# Patient Record
Sex: FEMALE | Race: WHITE | HISPANIC OR LATINO | URBAN - METROPOLITAN AREA
[De-identification: names, ages, dates, MRNs, and addresses within clinical notes are randomized per-mention and may not be internally consistent; named-entity substitution may affect disease eponyms.]

---

## 2022-04-06 ENCOUNTER — APPOINTMENT (RX ONLY)
Dept: URBAN - METROPOLITAN AREA CLINIC 328 | Facility: CLINIC | Age: 32
Setting detail: DERMATOLOGY
End: 2022-04-06

## 2022-04-06 DIAGNOSIS — L70.0 ACNE VULGARIS: ICD-10-CM | Status: INADEQUATELY CONTROLLED

## 2022-04-06 PROCEDURE — ? ADDITIONAL NOTES

## 2022-04-06 PROCEDURE — 99203 OFFICE O/P NEW LOW 30 MIN: CPT

## 2022-04-06 PROCEDURE — ? IN-HOUSE DISPENSING PHARMACY

## 2022-04-06 PROCEDURE — ? COUNSELING

## 2022-04-06 ASSESSMENT — LOCATION SIMPLE DESCRIPTION DERM: LOCATION SIMPLE: LEFT CHEEK

## 2022-04-06 ASSESSMENT — LOCATION DETAILED DESCRIPTION DERM
LOCATION DETAILED: LEFT INFERIOR CENTRAL MALAR CHEEK
LOCATION DETAILED: LEFT CENTRAL MALAR CHEEK

## 2022-04-06 ASSESSMENT — LOCATION ZONE DERM: LOCATION ZONE: FACE

## 2022-04-06 NOTE — PROCEDURE: COUNSELING
Tazorac Pregnancy And Lactation Text: This medication is not safe during pregnancy. It is unknown if this medication is excreted in breast milk.
Tetracycline Counseling: Patient counseled regarding possible photosensitivity and increased risk for sunburn.  Patient instructed to avoid sunlight, if possible.  When exposed to sunlight, patients should wear protective clothing, sunglasses, and sunscreen.  The patient was instructed to call the office immediately if the following severe adverse effects occur:  hearing changes, easy bruising/bleeding, severe headache, or vision changes.  The patient verbalized understanding of the proper use and possible adverse effects of tetracycline.  All of the patient's questions and concerns were addressed. Patient understands to avoid pregnancy while on therapy due to potential birth defects.
Topical Clindamycin Pregnancy And Lactation Text: This medication is Pregnancy Category B and is considered safe during pregnancy. It is unknown if it is excreted in breast milk.
Minocycline Pregnancy And Lactation Text: This medication is Pregnancy Category D and not consider safe during pregnancy. It is also excreted in breast milk.
Erythromycin Counseling:  I discussed with the patient the risks of erythromycin including but not limited to GI upset, allergic reaction, drug rash, diarrhea, increase in liver enzymes, and yeast infections.
Bactrim Pregnancy And Lactation Text: This medication is Pregnancy Category D and is known to cause fetal risk.  It is also excreted in breast milk.
Sarecycline Counseling: Patient advised regarding possible photosensitivity and discoloration of the teeth, skin, lips, tongue and gums.  Patient instructed to avoid sunlight, if possible.  When exposed to sunlight, patients should wear protective clothing, sunglasses, and sunscreen.  The patient was instructed to call the office immediately if the following severe adverse effects occur:  hearing changes, easy bruising/bleeding, severe headache, or vision changes.  The patient verbalized understanding of the proper use and possible adverse effects of sarecycline.  All of the patient's questions and concerns were addressed.
Birth Control Pills Counseling: Birth Control Pill Counseling: I discussed with the patient the potential side effects of OCPs including but not limited to increased risk of stroke, heart attack, thrombophlebitis, deep venous thrombosis, hepatic adenomas, breast changes, GI upset, headaches, and depression.  The patient verbalized understanding of the proper use and possible adverse effects of OCPs. All of the patient's questions and concerns were addressed.
Benzoyl Peroxide Pregnancy And Lactation Text: This medication is Pregnancy Category C. It is unknown if benzoyl peroxide is excreted in breast milk.
Topical Sulfur Applications Pregnancy And Lactation Text: This medication is Pregnancy Category C and has an unknown safety profile during pregnancy. It is unknown if this topical medication is excreted in breast milk.
Isotretinoin Pregnancy And Lactation Text: This medication is Pregnancy Category X and is considered extremely dangerous during pregnancy. It is unknown if it is excreted in breast milk.
Winlevi Counseling:  I discussed with the patient the risks of topical clascoterone including but not limited to erythema, scaling, itching, and stinging. Patient voiced their understanding.
Dapsone Counseling: I discussed with the patient the risks of dapsone including but not limited to hemolytic anemia, agranulocytosis, rashes, methemoglobinemia, kidney failure, peripheral neuropathy, headaches, GI upset, and liver toxicity.  Patients who start dapsone require monitoring including baseline LFTs and weekly CBCs for the first month, then every month thereafter.  The patient verbalized understanding of the proper use and possible adverse effects of dapsone.  All of the patient's questions and concerns were addressed.
Topical Retinoid Pregnancy And Lactation Text: This medication is Pregnancy Category C. It is unknown if this medication is excreted in breast milk.
Spironolactone Counseling: Patient advised regarding risks of diarrhea, abdominal pain, hyperkalemia, birth defects (for female patients), liver toxicity and renal toxicity. The patient may need blood work to monitor liver and kidney function and potassium levels while on therapy. The patient verbalized understanding of the proper use and possible adverse effects of spironolactone.  All of the patient's questions and concerns were addressed.
Doxycycline Counseling:  Patient counseled regarding possible photosensitivity and increased risk for sunburn.  Patient instructed to avoid sunlight, if possible.  When exposed to sunlight, patients should wear protective clothing, sunglasses, and sunscreen.  The patient was instructed to call the office immediately if the following severe adverse effects occur:  hearing changes, easy bruising/bleeding, severe headache, or vision changes.  The patient verbalized understanding of the proper use and possible adverse effects of doxycycline.  All of the patient's questions and concerns were addressed.
High Dose Vitamin A Pregnancy And Lactation Text: High dose vitamin A therapy is contraindicated during pregnancy and breast feeding.
Azithromycin Pregnancy And Lactation Text: This medication is considered safe during pregnancy and is also secreted in breast milk.
Use Enhanced Medication Counseling?: No
Spironolactone Pregnancy And Lactation Text: This medication can cause feminization of the male fetus and should be avoided during pregnancy. The active metabolite is also found in breast milk.
Minocycline Counseling: Patient advised regarding possible photosensitivity and discoloration of the teeth, skin, lips, tongue and gums.  Patient instructed to avoid sunlight, if possible.  When exposed to sunlight, patients should wear protective clothing, sunglasses, and sunscreen.  The patient was instructed to call the office immediately if the following severe adverse effects occur:  hearing changes, easy bruising/bleeding, severe headache, or vision changes.  The patient verbalized understanding of the proper use and possible adverse effects of minocycline.  All of the patient's questions and concerns were addressed.
Bactrim Counseling:  I discussed with the patient the risks of sulfa antibiotics including but not limited to GI upset, allergic reaction, drug rash, diarrhea, dizziness, photosensitivity, and yeast infections.  Rarely, more serious reactions can occur including but not limited to aplastic anemia, agranulocytosis, methemoglobinemia, blood dyscrasias, liver or kidney failure, lung infiltrates or desquamative/blistering drug rashes.
Doxycycline Pregnancy And Lactation Text: This medication is Pregnancy Category D and not consider safe during pregnancy. It is also excreted in breast milk but is considered safe for shorter treatment courses.
Topical Clindamycin Counseling: Patient counseled that this medication may cause skin irritation or allergic reactions.  In the event of skin irritation, the patient was advised to reduce the amount of the drug applied or use it less frequently.   The patient verbalized understanding of the proper use and possible adverse effects of clindamycin.  All of the patient's questions and concerns were addressed.
Erythromycin Pregnancy And Lactation Text: This medication is Pregnancy Category B and is considered safe during pregnancy. It is also excreted in breast milk.
Benzoyl Peroxide Counseling: Patient counseled that medicine may cause skin irritation and bleach clothing.  In the event of skin irritation, the patient was advised to reduce the amount of the drug applied or use it less frequently.   The patient verbalized understanding of the proper use and possible adverse effects of benzoyl peroxide.  All of the patient's questions and concerns were addressed.
Topical Sulfur Applications Counseling: Topical Sulfur Counseling: Patient counseled that this medication may cause skin irritation or allergic reactions.  In the event of skin irritation, the patient was advised to reduce the amount of the drug applied or use it less frequently.   The patient verbalized understanding of the proper use and possible adverse effects of topical sulfur application.  All of the patient's questions and concerns were addressed.
Isotretinoin Counseling: Patient should get monthly blood tests, not donate blood, not drive at night if vision affected, not share medication, and not undergo elective surgery for 6 months after tx completed. Side effects reviewed, pt to contact office should one occur.
Birth Control Pills Pregnancy And Lactation Text: This medication should be avoided if pregnant and for the first 30 days post-partum.
Detail Level: Zone
Topical Retinoid counseling:  Patient advised to apply a pea-sized amount only at bedtime and wait 30 minutes after washing their face before applying.  If too drying, patient may add a non-comedogenic moisturizer. The patient verbalized understanding of the proper use and possible adverse effects of retinoids.  All of the patient's questions and concerns were addressed.
Tazorac Counseling:  Patient advised that medication is irritating and drying.  Patient may need to apply sparingly and wash off after an hour before eventually leaving it on overnight.  The patient verbalized understanding of the proper use and possible adverse effects of tazorac.  All of the patient's questions and concerns were addressed.
Winlevi Pregnancy And Lactation Text: This medication is considered safe during pregnancy and breastfeeding.
Dapsone Pregnancy And Lactation Text: This medication is Pregnancy Category C and is not considered safe during pregnancy or breast feeding.
High Dose Vitamin A Counseling: Side effects reviewed, pt to contact office should one occur.
Azithromycin Counseling:  I discussed with the patient the risks of azithromycin including but not limited to GI upset, allergic reaction, drug rash, diarrhea, and yeast infections.

## 2022-04-06 NOTE — PROCEDURE: ADDITIONAL NOTES
Render Risk Assessment In Note?: no
Detail Level: Simple
Additional Notes: Patient consent was obtained to proceed with the visit and recommended plan of care after discussion of all risks and benefits, including the risks of COVID-19 exposure
Additional Notes: DS recommended to follow up with aestheticians

## 2022-04-06 NOTE — PROCEDURE: IN-HOUSE DISPENSING PHARMACY
Product 49 Price/Unit (In Dollars): 0
Product 67 Unit Type: mg
Product 13 Price/Unit (In Dollars): 45
Product 17 Amount/Unit (Numbers Only): 1
Detail Level: Zone
Product 24 Application Directions: Take one tablet twice daily or as directed by provider.
Product 4 Unit Type: bottle(s)
Name Of Product 11: #11 Antibacterial Ointment
Product 15 Application Directions: Apply to the scalp 2-3 times weekly. Can be applied every other shampoo as well.
Name Of Product 20: #20 Loratadine
Product 22 Amount/Unit (Numbers Only): 18
Product 7 Price/Unit (In Dollars): 55
Send Charges To Patient Encounter: Yes
Product 18 Price/Unit (In Dollars): 10
Render Product Pricing In Note: No
Name Of Product 5: #5 Actinic Keratosis (Actinic Gel)
Product 24 Unit Type: capsules
Product 9 Application Directions: Apply to the affected area three times daily.
Name Of Product 16: #16 Dermatitis Topical Solution
Product 20 Application Directions: Take one tablet daily.
Product 20 Unit Type: tablets
Product 11 Application Directions: Apply twice daily to wound/affected areas.
Product 5 Application Directions: For Warts/MC- apply to the area 2-3 times weekly.\\nFor (Pre)Skin Cancer- apply nightly for 2-6 weeks or as directed by physician.
Product 3 Price/Unit (In Dollars): 40
Name Of Product 1: #1 Acne Gel
Product 18 Amount/Unit (Numbers Only): 20
Product 3 Application Directions: Apply to the face once daily AM or PM depending if Retinol or Adapalene given for bedtime.
Product 23 Application Directions: Use as directed by provider.
Name Of Product 8: #8 Anti Fungal Cream
Name Of Product 19: #19 Doxycycline
Product 12 Price/Unit (In Dollars): 60
Name Of Product 10: #10 Fungal Dermatitis Cream
Product 14 Application Directions: Apply to the face once daily (AM or PM).
Product 12 Refills: 3
Name Of Product 24: Spironolactone 25mg
Name Of Product 4: #4 Acne Gel
Product 8 Application Directions: Apply to the affected area twice daily.
Name Of Product 15: #15 Antifungal Shampoo
Product 19 Application Directions: Take one tablet twice daily with food or as directed by provider.
Product 10 Application Directions: Apply to the affected area twice daily for 10 days.
Product 4 Application Directions: Apply to face at bedtime.
Name Of Product 9: #9 Dermatitis Cream
Product 2 Price/Unit (In Dollars): 50
Product 2 Application Directions: Apply to the face at bedtime. Start every other night hen increase usage depending on tolerance.
Product 20 Price/Unit (In Dollars): 15
Name Of Product 7: #7 Anti Fungal Nail Solution
Name Of Product 18: #18 Bactrim
Product 13 Application Directions: Apply to the face thin layer daily.
Product 7 Application Directions: Apply to the nails daily. (Please do not apply socks for about 5 hours, best if applied at night) Remove once weekly then start again.
Product 16 Price/Unit (In Dollars): 35
Product 20 Amount/Unit (Numbers Only): 30
Name Of Product 3: #3 Acne Gel Combo
Name Of Product 14: #14 Rosacea Silicone Gel
Name Of Product 23: #23 Prednisone 50mg
Name Of Product 12: #12 Melasma Emulsion
Product 16 Application Directions: Apply to the affected area 1-2 times daily depending on diagnosis.
Name Of Product 21: #21 Minocycline
Product 1 Application Directions: Apply to the face twice daily (if another rx is being prescribed it will only be applied in the AM)
Product 21 Application Directions: Take one tablet twice daily with food.
Name Of Product 6: #6 Alopecia Topical Solution/Gel
Product 23 Amount/Unit (Numbers Only): 5
Name Of Product 17: #17 Xerosis Gel
Product 12 Application Directions: Apply to the face three times weekly at night for two months only. Take a two month break and then continue for two months again. Can be applied with Lytera daily.
Product 6 Application Directions: Apply to the scalp three times weekly. (Suggested Monday, Wednesday, Friday)
Name Of Product 22: #22 Prednisone 20mg
Name Of Product 2: #2 Acne Moisturizing Cream
Name Of Product 13: #13 Rosacea Cream
Product 17 Application Directions: Apply daily to affected areas.

## 2022-06-08 ENCOUNTER — APPOINTMENT (RX ONLY)
Dept: URBAN - METROPOLITAN AREA CLINIC 328 | Facility: CLINIC | Age: 32
Setting detail: DERMATOLOGY
End: 2022-06-08

## 2022-06-08 DIAGNOSIS — L70.0 ACNE VULGARIS: ICD-10-CM

## 2022-06-08 PROCEDURE — 99213 OFFICE O/P EST LOW 20 MIN: CPT

## 2022-06-08 PROCEDURE — ? COUNSELING

## 2022-06-08 PROCEDURE — ? IN-HOUSE DISPENSING PHARMACY

## 2022-06-08 ASSESSMENT — LOCATION SIMPLE DESCRIPTION DERM: LOCATION SIMPLE: LEFT CHEEK

## 2022-06-08 ASSESSMENT — LOCATION DETAILED DESCRIPTION DERM
LOCATION DETAILED: LEFT INFERIOR CENTRAL MALAR CHEEK
LOCATION DETAILED: LEFT CENTRAL MALAR CHEEK

## 2022-06-08 ASSESSMENT — LOCATION ZONE DERM: LOCATION ZONE: FACE

## 2022-06-08 NOTE — PROCEDURE: IN-HOUSE DISPENSING PHARMACY
Product 49 Price/Unit (In Dollars): 0
Product 67 Unit Type: mg
Product 13 Price/Unit (In Dollars): 45
Product 17 Amount/Unit (Numbers Only): 1
Detail Level: Zone
Product 24 Application Directions: Take one tablet twice daily or as directed by provider.
Product 4 Unit Type: bottle(s)
Name Of Product 11: #11 Antibacterial Ointment
Product 15 Application Directions: Apply to the scalp 2-3 times weekly. Can be applied every other shampoo as well.
Name Of Product 20: #20 Loratadine
Product 22 Amount/Unit (Numbers Only): 18
Product 7 Price/Unit (In Dollars): 55
Send Charges To Patient Encounter: Yes
Product 18 Price/Unit (In Dollars): 10
Render Product Pricing In Note: No
Name Of Product 5: #5 Actinic Keratosis (Actinic Gel)
Product 24 Unit Type: capsules
Product 9 Application Directions: Apply to the affected area three times daily.
Name Of Product 16: #16 Dermatitis Topical Solution
Product 20 Application Directions: Take one tablet daily.
Product 20 Unit Type: tablets
Product 11 Application Directions: Apply twice daily to wound/affected areas.
Product 5 Application Directions: For Warts/MC- apply to the area 2-3 times weekly.\\nFor (Pre)Skin Cancer- apply nightly for 2-6 weeks or as directed by physician.
Product 3 Price/Unit (In Dollars): 40
Name Of Product 1: #1 Acne Gel
Product 18 Amount/Unit (Numbers Only): 20
Product 3 Application Directions: Apply to the face once daily AM or PM depending if Retinol or Adapalene given for bedtime.
Product 23 Application Directions: Use as directed by provider.
Name Of Product 8: #8 Anti Fungal Cream
Name Of Product 19: #19 Doxycycline
Product 12 Price/Unit (In Dollars): 60
Name Of Product 10: #10 Fungal Dermatitis Cream
Product 14 Application Directions: Apply to the face once daily (AM or PM).
Product 12 Refills: 3
Name Of Product 24: Spironolactone 25mg
Name Of Product 4: #4 Acne Gel
Product 8 Application Directions: Apply to the affected area twice daily.
Name Of Product 15: #15 Antifungal Shampoo
Product 19 Application Directions: Take one tablet twice daily with food or as directed by provider.
Product 10 Application Directions: Apply to the affected area twice daily for 10 days.
Product 4 Application Directions: Apply to face at bedtime.
Name Of Product 9: #9 Dermatitis Cream
Product 2 Price/Unit (In Dollars): 50
Product 9 Pereira/Unit (In Dollars): 45
Product 2 Application Directions: Apply to the face at bedtime. Start every other night hen increase usage depending on tolerance.
Product 20 Price/Unit (In Dollars): 15
Name Of Product 7: #7 Anti Fungal Nail Solution
Name Of Product 18: #18 Bactrim
Product 13 Application Directions: Apply to the face thin layer daily.
Product 7 Application Directions: Apply to the nails daily. (Please do not apply socks for about 5 hours, best if applied at night) Remove once weekly then start again.
Product 16 Price/Unit (In Dollars): 35
Product 20 Amount/Unit (Numbers Only): 30
Name Of Product 3: #3 Acne Gel Combo
Name Of Product 14: #14 Rosacea Silicone Gel
Name Of Product 23: #23 Prednisone 50mg
Name Of Product 12: #12 Melasma Emulsion
Product 16 Application Directions: Apply to the affected area 1-2 times daily depending on diagnosis.
Name Of Product 21: #21 Minocycline
Product 1 Application Directions: Apply to the face twice daily (if another rx is being prescribed it will only be applied in the AM)
Product 21 Application Directions: Take one tablet twice daily with food.
Name Of Product 6: #6 Alopecia Topical Solution/Gel
Product 23 Amount/Unit (Numbers Only): 5
Name Of Product 17: #17 Xerosis Gel
Product 12 Application Directions: Apply to the face three times weekly at night for two months only. Take a two month break and then continue for two months again. Can be applied with Lytera daily.
Product 6 Application Directions: Apply to the scalp three times weekly. (Suggested Monday, Wednesday, Friday)
Name Of Product 22: #22 Prednisone 20mg
Name Of Product 2: #2 Acne Moisturizing Cream
Name Of Product 13: #13 Rosacea Cream
Product 17 Application Directions: Apply daily to affected areas.

## 2023-10-06 ENCOUNTER — APPOINTMENT (RX ONLY)
Dept: URBAN - METROPOLITAN AREA CLINIC 333 | Facility: CLINIC | Age: 33
Setting detail: DERMATOLOGY
End: 2023-10-06

## 2023-10-06 DIAGNOSIS — H01.13 ECZEMATOUS DERMATITIS OF EYELID: ICD-10-CM | Status: INADEQUATELY CONTROLLED

## 2023-10-06 PROBLEM — H01.134 ECZEMATOUS DERMATITIS OF LEFT UPPER EYELID: Status: ACTIVE | Noted: 2023-10-06

## 2023-10-06 PROCEDURE — 99213 OFFICE O/P EST LOW 20 MIN: CPT

## 2023-10-06 PROCEDURE — ? PRESCRIPTION

## 2023-10-06 PROCEDURE — ? PRESCRIPTION MEDICATION MANAGEMENT

## 2023-10-06 PROCEDURE — ? COUNSELING

## 2023-10-06 RX ORDER — TACROLIMUS 0.3 MG/G
AS DIRECTED OINTMENT TOPICAL BID
Qty: 30 | Refills: 2 | Status: ERX | COMMUNITY
Start: 2023-10-06

## 2023-10-06 RX ADMIN — TACROLIMUS AS DIRECTED: 0.3 OINTMENT TOPICAL at 00:00

## 2023-10-06 ASSESSMENT — LOCATION ZONE DERM: LOCATION ZONE: EYELID

## 2023-10-06 ASSESSMENT — LOCATION SIMPLE DESCRIPTION DERM: LOCATION SIMPLE: LEFT SUPERIOR EYELID

## 2023-10-06 ASSESSMENT — LOCATION DETAILED DESCRIPTION DERM: LOCATION DETAILED: LEFT MEDIAL SUPERIOR EYELID

## 2023-10-06 NOTE — PROCEDURE: PRESCRIPTION MEDICATION MANAGEMENT
Render In Strict Bullet Format?: No
Initiate Treatment: Tacrolimus BID
Detail Level: Zone
Continue Regimen: Hydrocortisone cream OTC

## 2023-10-09 ENCOUNTER — RX ONLY (OUTPATIENT)
Age: 33
Setting detail: RX ONLY
End: 2023-10-09

## 2023-10-09 RX ORDER — TACROLIMUS 1 MG/G
AS DIRECTED OINTMENT TOPICAL BID PRN
Qty: 30 | Refills: 2 | Status: ERX | COMMUNITY
Start: 2023-10-09